# Patient Record
Sex: FEMALE | Race: BLACK OR AFRICAN AMERICAN | NOT HISPANIC OR LATINO | URBAN - METROPOLITAN AREA
[De-identification: names, ages, dates, MRNs, and addresses within clinical notes are randomized per-mention and may not be internally consistent; named-entity substitution may affect disease eponyms.]

---

## 2016-10-14 RX ORDER — METOPROLOL TARTRATE 50 MG
1 TABLET ORAL
Qty: 0 | Refills: 0 | COMMUNITY
Start: 2016-10-14

## 2016-10-14 RX ORDER — AMLODIPINE BESYLATE 2.5 MG/1
1 TABLET ORAL
Qty: 0 | Refills: 0 | COMMUNITY
Start: 2016-10-14

## 2017-03-22 ENCOUNTER — INPATIENT (INPATIENT)
Facility: HOSPITAL | Age: 82
LOS: 3 days | Discharge: HOME HEALTH | End: 2017-03-26
Attending: GENERAL PRACTICE | Admitting: GENERAL PRACTICE
Payer: MEDICARE

## 2017-03-22 VITALS
OXYGEN SATURATION: 98 % | SYSTOLIC BLOOD PRESSURE: 155 MMHG | TEMPERATURE: 98 F | DIASTOLIC BLOOD PRESSURE: 77 MMHG | RESPIRATION RATE: 24 BRPM | HEART RATE: 84 BPM

## 2017-03-22 LAB
ALBUMIN SERPL ELPH-MCNC: 3 G/DL — LOW (ref 3.3–5)
ALP SERPL-CCNC: 101 U/L — SIGNIFICANT CHANGE UP (ref 40–120)
ALT FLD-CCNC: 12 U/L — SIGNIFICANT CHANGE UP (ref 4–33)
APPEARANCE UR: SIGNIFICANT CHANGE UP
AST SERPL-CCNC: 18 U/L — SIGNIFICANT CHANGE UP (ref 4–32)
B PERT DNA SPEC QL NAA+PROBE: SIGNIFICANT CHANGE UP
BASE EXCESS BLDV CALC-SCNC: 0.5 MMOL/L — SIGNIFICANT CHANGE UP
BASOPHILS # BLD AUTO: 0.03 K/UL — SIGNIFICANT CHANGE UP (ref 0–0.2)
BASOPHILS NFR BLD AUTO: 0.4 % — SIGNIFICANT CHANGE UP (ref 0–2)
BILIRUB SERPL-MCNC: 0.3 MG/DL — SIGNIFICANT CHANGE UP (ref 0.2–1.2)
BILIRUB UR-MCNC: NEGATIVE — SIGNIFICANT CHANGE UP
BLOOD GAS VENOUS - CREATININE: 2.59 MG/DL — HIGH (ref 0.5–1.3)
BLOOD UR QL VISUAL: HIGH
BUN SERPL-MCNC: 41 MG/DL — HIGH (ref 7–23)
C PNEUM DNA SPEC QL NAA+PROBE: NOT DETECTED — SIGNIFICANT CHANGE UP
CALCIUM SERPL-MCNC: 8.7 MG/DL — SIGNIFICANT CHANGE UP (ref 8.4–10.5)
CHLORIDE BLDV-SCNC: 110 MMOL/L — HIGH (ref 96–108)
CHLORIDE SERPL-SCNC: 104 MMOL/L — SIGNIFICANT CHANGE UP (ref 98–107)
CK MB BLD-MCNC: 1.44 NG/ML — SIGNIFICANT CHANGE UP (ref 1–4.7)
CK SERPL-CCNC: 31 U/L — SIGNIFICANT CHANGE UP (ref 25–170)
CO2 SERPL-SCNC: 22 MMOL/L — SIGNIFICANT CHANGE UP (ref 22–31)
COLOR SPEC: YELLOW — SIGNIFICANT CHANGE UP
CREAT SERPL-MCNC: 2.45 MG/DL — HIGH (ref 0.5–1.3)
EOSINOPHIL # BLD AUTO: 0.12 K/UL — SIGNIFICANT CHANGE UP (ref 0–0.5)
EOSINOPHIL NFR BLD AUTO: 1.5 % — SIGNIFICANT CHANGE UP (ref 0–6)
FLUAV H1 2009 PAND RNA SPEC QL NAA+PROBE: NOT DETECTED — SIGNIFICANT CHANGE UP
FLUAV H1 RNA SPEC QL NAA+PROBE: NOT DETECTED — SIGNIFICANT CHANGE UP
FLUAV H3 RNA SPEC QL NAA+PROBE: NOT DETECTED — SIGNIFICANT CHANGE UP
FLUAV SUBTYP SPEC NAA+PROBE: SIGNIFICANT CHANGE UP
FLUBV RNA SPEC QL NAA+PROBE: NOT DETECTED — SIGNIFICANT CHANGE UP
GAS PNL BLDV: 142 MMOL/L — SIGNIFICANT CHANGE UP (ref 136–146)
GLUCOSE BLDV-MCNC: 83 — SIGNIFICANT CHANGE UP (ref 70–99)
GLUCOSE SERPL-MCNC: 100 MG/DL — HIGH (ref 70–99)
GLUCOSE UR-MCNC: NEGATIVE — SIGNIFICANT CHANGE UP
HADV DNA SPEC QL NAA+PROBE: NOT DETECTED — SIGNIFICANT CHANGE UP
HCO3 BLDV-SCNC: 24 MMOL/L — SIGNIFICANT CHANGE UP (ref 20–27)
HCOV 229E RNA SPEC QL NAA+PROBE: NOT DETECTED — SIGNIFICANT CHANGE UP
HCOV HKU1 RNA SPEC QL NAA+PROBE: NOT DETECTED — SIGNIFICANT CHANGE UP
HCOV NL63 RNA SPEC QL NAA+PROBE: NOT DETECTED — SIGNIFICANT CHANGE UP
HCOV OC43 RNA SPEC QL NAA+PROBE: NOT DETECTED — SIGNIFICANT CHANGE UP
HCT VFR BLD CALC: 30.7 % — LOW (ref 34.5–45)
HCT VFR BLDV CALC: SIGNIFICANT CHANGE UP % (ref 34.5–45)
HGB BLD-MCNC: 9.3 G/DL — LOW (ref 11.5–15.5)
HGB BLDV-MCNC: SIGNIFICANT CHANGE UP G/DL (ref 11.5–15.5)
HMPV RNA SPEC QL NAA+PROBE: NOT DETECTED — SIGNIFICANT CHANGE UP
HPIV1 RNA SPEC QL NAA+PROBE: NOT DETECTED — SIGNIFICANT CHANGE UP
HPIV2 RNA SPEC QL NAA+PROBE: NOT DETECTED — SIGNIFICANT CHANGE UP
HPIV3 RNA SPEC QL NAA+PROBE: NOT DETECTED — SIGNIFICANT CHANGE UP
HPIV4 RNA SPEC QL NAA+PROBE: NOT DETECTED — SIGNIFICANT CHANGE UP
HYALINE CASTS # UR AUTO: SIGNIFICANT CHANGE UP (ref 0–?)
IMM GRANULOCYTES NFR BLD AUTO: 0.3 % — SIGNIFICANT CHANGE UP (ref 0–1.5)
KETONES UR-MCNC: NEGATIVE — SIGNIFICANT CHANGE UP
LACTATE BLDV-MCNC: 1.3 MMOL/L — SIGNIFICANT CHANGE UP (ref 0.5–2)
LEUKOCYTE ESTERASE UR-ACNC: HIGH
LYMPHOCYTES # BLD AUTO: 1.62 K/UL — SIGNIFICANT CHANGE UP (ref 1–3.3)
LYMPHOCYTES # BLD AUTO: 20.5 % — SIGNIFICANT CHANGE UP (ref 13–44)
M PNEUMO DNA SPEC QL NAA+PROBE: NOT DETECTED — SIGNIFICANT CHANGE UP
MCHC RBC-ENTMCNC: 30.3 % — LOW (ref 32–36)
MCHC RBC-ENTMCNC: 31.2 PG — SIGNIFICANT CHANGE UP (ref 27–34)
MCV RBC AUTO: 103 FL — HIGH (ref 80–100)
MONOCYTES # BLD AUTO: 0.74 K/UL — SIGNIFICANT CHANGE UP (ref 0–0.9)
MONOCYTES NFR BLD AUTO: 9.4 % — SIGNIFICANT CHANGE UP (ref 2–14)
NEUTROPHILS # BLD AUTO: 5.37 K/UL — SIGNIFICANT CHANGE UP (ref 1.8–7.4)
NEUTROPHILS NFR BLD AUTO: 67.9 % — SIGNIFICANT CHANGE UP (ref 43–77)
NITRITE UR-MCNC: NEGATIVE — SIGNIFICANT CHANGE UP
NON-SQ EPI CELLS # UR AUTO: 3 — SIGNIFICANT CHANGE UP
PCO2 BLDV: 52 MMHG — HIGH (ref 41–51)
PH BLDV: 7.32 PH — SIGNIFICANT CHANGE UP (ref 7.32–7.43)
PH UR: 6.5 — SIGNIFICANT CHANGE UP (ref 4.6–8)
PLATELET # BLD AUTO: 406 K/UL — HIGH (ref 150–400)
PMV BLD: 9.8 FL — SIGNIFICANT CHANGE UP (ref 7–13)
PO2 BLDV: 34 MMHG — LOW (ref 35–40)
POTASSIUM BLDV-SCNC: 5.8 MMOL/L — HIGH (ref 3.4–4.5)
POTASSIUM SERPL-MCNC: 5.5 MMOL/L — HIGH (ref 3.5–5.3)
POTASSIUM SERPL-SCNC: 5.5 MMOL/L — HIGH (ref 3.5–5.3)
PROT SERPL-MCNC: 8.3 G/DL — SIGNIFICANT CHANGE UP (ref 6–8.3)
PROT UR-MCNC: 500 — HIGH
RBC # BLD: 2.98 M/UL — LOW (ref 3.8–5.2)
RBC # FLD: 16.9 % — HIGH (ref 10.3–14.5)
RBC CASTS # UR COMP ASSIST: SIGNIFICANT CHANGE UP (ref 0–?)
RSV RNA SPEC QL NAA+PROBE: NOT DETECTED — SIGNIFICANT CHANGE UP
RV+EV RNA SPEC QL NAA+PROBE: NOT DETECTED — SIGNIFICANT CHANGE UP
SAO2 % BLDV: 58 % — LOW (ref 60–85)
SODIUM SERPL-SCNC: 142 MMOL/L — SIGNIFICANT CHANGE UP (ref 135–145)
SP GR SPEC: 1.02 — SIGNIFICANT CHANGE UP (ref 1–1.03)
SQUAMOUS # UR AUTO: SIGNIFICANT CHANGE UP
TROPONIN T SERPL-MCNC: < 0.06 NG/ML — SIGNIFICANT CHANGE UP (ref 0–0.06)
UROBILINOGEN FLD QL: NORMAL E.U. — SIGNIFICANT CHANGE UP (ref 0.1–0.2)
WBC # BLD: 7.9 K/UL — SIGNIFICANT CHANGE UP (ref 3.8–10.5)
WBC # FLD AUTO: 7.9 K/UL — SIGNIFICANT CHANGE UP (ref 3.8–10.5)
WBC UR QL: >50 — HIGH (ref 0–?)

## 2017-03-22 RX ORDER — IPRATROPIUM/ALBUTEROL SULFATE 18-103MCG
3 AEROSOL WITH ADAPTER (GRAM) INHALATION
Qty: 0 | Refills: 0 | Status: COMPLETED | OUTPATIENT
Start: 2017-03-22 | End: 2017-03-23

## 2017-03-22 NOTE — ED PROVIDER NOTE - ATTENDING CONTRIBUTION TO CARE
Pt was seen and evaluated by me. Pt notes over the past 3 days to have increased weakness with SOB. Pt was seen by Pulmonologist and found to be sating in 80s. Pt denies any fever, cough, chest pain, or abd pain. Sating 98% on 2L NC. RRR. Coarse breath sounds b/l. Abd soft, non-tender.

## 2017-03-22 NOTE — ED ADULT NURSE NOTE - OBJECTIVE STATEMENT
Pt rec'd A&Ox3 from [facility] on nasal cannula, states " my doctor sent me from his office because my oxygen level went down to 81%." Pt appears slightly dyspneic on exertion, O2 saturation desats to 89% on room air while talking. Pt has hx of COPD denies home O2 use. Pt rec'd A&Ox3 from [facility] on nasal cannula, states " my doctor sent me from his office because my oxygen level went down to 81%." Pt appears slightly dyspneic on exertion, O2 saturation desats to 89% on room air while talking. Pt has hx of COPD denies home O2 use. Pt denies CP, n/v/d, c/o weakness and increased WALTERS x 2 days. Pt rpt hx of rt mastectomy, w/ arm precautions. Pt has skin graft on top of lt arm from hematoma from fall x 2mths ago. Pt has blanchable erythema between buttocks on exam. Placed on cardiac monitor report endorsed to primary RN.

## 2017-03-22 NOTE — ED ADULT TRIAGE NOTE - CHIEF COMPLAINT QUOTE
Arrives via ems from pmd's office after being found to be hypoxic. (po 85%ra). Pt  c/o generalized weakness and dyspnea on exertion x 2 days. Denies fever. denies cough.  Denies chest pain.   A+Ox3.  HOB up.  Pt in nad.  Pulse ox 98 % w O2 2Lnc at triage.

## 2017-03-22 NOTE — ED PROCEDURE NOTE - NS ED PROCEDURE ASSISTED BY
The procedure was performed independently The procedure was performed independently/Supervision was available

## 2017-03-22 NOTE — ED PROVIDER NOTE - PMH
Aortic valve disorder    Breast cancer  treated with radiation  Cataract  Bilateral eyes  COPD (chronic obstructive pulmonary disease)    Coronary artery disease    Hearing loss    High cholesterol    Hypertension    Shingles  2 years ago, right flank.  Pt. complains of residual "burning" nerve pain.

## 2017-03-22 NOTE — ED PROVIDER NOTE - PSH
H/O carotid endarterectomy  left  History of cholecystectomy    History of gastrectomy  1972  S/P cataract extraction  left  S/P coronary angiogram  November 2014- to assess aortic valve, no stents placed  S/P lumpectomy of breast  2010, right breast  S/P TAVR (transcatheter aortic valve replacement)  TRANSAORTIC APPROACH

## 2017-03-22 NOTE — ED PROVIDER NOTE - OBJECTIVE STATEMENT
89 y/o female with PMHx of COPD, CAD, HTN, and HLD presents to ED for increased weakness and SOB X 3 days. Pt states she was having increased weakness over the past 3 days with increased SOB and noted in Dr. Gonzalez's office to be sating in 80s. Pt denies any fever, cough, chest pain, or abd pain. Pt noted to be 89% on RA in triage and 98% on 2L NC.

## 2017-03-23 DIAGNOSIS — J44.1 CHRONIC OBSTRUCTIVE PULMONARY DISEASE WITH (ACUTE) EXACERBATION: ICD-10-CM

## 2017-03-23 DIAGNOSIS — N30.00 ACUTE CYSTITIS WITHOUT HEMATURIA: ICD-10-CM

## 2017-03-23 DIAGNOSIS — I10 ESSENTIAL (PRIMARY) HYPERTENSION: ICD-10-CM

## 2017-03-23 DIAGNOSIS — R54 AGE-RELATED PHYSICAL DEBILITY: ICD-10-CM

## 2017-03-23 DIAGNOSIS — Z41.8 ENCOUNTER FOR OTHER PROCEDURES FOR PURPOSES OTHER THAN REMEDYING HEALTH STATE: ICD-10-CM

## 2017-03-23 DIAGNOSIS — R09.02 HYPOXEMIA: ICD-10-CM

## 2017-03-23 DIAGNOSIS — D64.9 ANEMIA, UNSPECIFIED: ICD-10-CM

## 2017-03-23 DIAGNOSIS — N18.4 CHRONIC KIDNEY DISEASE, STAGE 4 (SEVERE): ICD-10-CM

## 2017-03-23 DIAGNOSIS — I25.10 ATHEROSCLEROTIC HEART DISEASE OF NATIVE CORONARY ARTERY WITHOUT ANGINA PECTORIS: ICD-10-CM

## 2017-03-23 LAB
APTT BLD: 31.6 SEC — SIGNIFICANT CHANGE UP (ref 27.5–37.4)
BASE EXCESS BLDV CALC-SCNC: -3.3 MMOL/L — SIGNIFICANT CHANGE UP
BASOPHILS # BLD AUTO: 0.02 K/UL — SIGNIFICANT CHANGE UP (ref 0–0.2)
BASOPHILS NFR BLD AUTO: 0.2 % — SIGNIFICANT CHANGE UP (ref 0–2)
BLOOD GAS VENOUS - CREATININE: 2.4 MG/DL — HIGH (ref 0.5–1.3)
BUN SERPL-MCNC: 40 MG/DL — HIGH (ref 7–23)
BUN SERPL-MCNC: 41 MG/DL — HIGH (ref 7–23)
CALCIUM SERPL-MCNC: 8.4 MG/DL — SIGNIFICANT CHANGE UP (ref 8.4–10.5)
CALCIUM SERPL-MCNC: 8.9 MG/DL — SIGNIFICANT CHANGE UP (ref 8.4–10.5)
CHLORIDE BLDV-SCNC: 113 MMOL/L — HIGH (ref 96–108)
CHLORIDE SERPL-SCNC: 102 MMOL/L — SIGNIFICANT CHANGE UP (ref 98–107)
CHLORIDE SERPL-SCNC: 106 MMOL/L — SIGNIFICANT CHANGE UP (ref 98–107)
CO2 SERPL-SCNC: 20 MMOL/L — LOW (ref 22–31)
CO2 SERPL-SCNC: 25 MMOL/L — SIGNIFICANT CHANGE UP (ref 22–31)
CREAT SERPL-MCNC: 2.44 MG/DL — HIGH (ref 0.5–1.3)
CREAT SERPL-MCNC: 2.47 MG/DL — HIGH (ref 0.5–1.3)
EOSINOPHIL # BLD AUTO: 0.01 K/UL — SIGNIFICANT CHANGE UP (ref 0–0.5)
EOSINOPHIL NFR BLD AUTO: 0.1 % — SIGNIFICANT CHANGE UP (ref 0–6)
GAS PNL BLDV: 142 MMOL/L — SIGNIFICANT CHANGE UP (ref 136–146)
GLUCOSE BLDV-MCNC: 122 — HIGH (ref 70–99)
GLUCOSE SERPL-MCNC: 123 MG/DL — HIGH (ref 70–99)
GLUCOSE SERPL-MCNC: 83 MG/DL — SIGNIFICANT CHANGE UP (ref 70–99)
HCO3 BLDV-SCNC: 21 MMOL/L — SIGNIFICANT CHANGE UP (ref 20–27)
HCT VFR BLD CALC: 30.1 % — LOW (ref 34.5–45)
HCT VFR BLDV CALC: 28.8 % — LOW (ref 34.5–45)
HGB BLD-MCNC: 9.1 G/DL — LOW (ref 11.5–15.5)
HGB BLDV-MCNC: 9.3 G/DL — LOW (ref 11.5–15.5)
IMM GRANULOCYTES NFR BLD AUTO: 0.2 % — SIGNIFICANT CHANGE UP (ref 0–1.5)
INR BLD: 0.96 — SIGNIFICANT CHANGE UP (ref 0.88–1.17)
LACTATE BLDV-MCNC: 1.7 MMOL/L — SIGNIFICANT CHANGE UP (ref 0.5–2)
LYMPHOCYTES # BLD AUTO: 0.42 K/UL — LOW (ref 1–3.3)
LYMPHOCYTES # BLD AUTO: 5.2 % — LOW (ref 13–44)
MAGNESIUM SERPL-MCNC: 2 MG/DL — SIGNIFICANT CHANGE UP (ref 1.6–2.6)
MCHC RBC-ENTMCNC: 30.2 % — LOW (ref 32–36)
MCHC RBC-ENTMCNC: 31.4 PG — SIGNIFICANT CHANGE UP (ref 27–34)
MCV RBC AUTO: 103.8 FL — HIGH (ref 80–100)
MONOCYTES # BLD AUTO: 0.12 K/UL — SIGNIFICANT CHANGE UP (ref 0–0.9)
MONOCYTES NFR BLD AUTO: 1.5 % — LOW (ref 2–14)
NEUTROPHILS # BLD AUTO: 7.45 K/UL — HIGH (ref 1.8–7.4)
NEUTROPHILS NFR BLD AUTO: 92.8 % — HIGH (ref 43–77)
PCO2 BLDV: 47 MMHG — SIGNIFICANT CHANGE UP (ref 41–51)
PH BLDV: 7.3 PH — LOW (ref 7.32–7.43)
PHOSPHATE SERPL-MCNC: 3.7 MG/DL — SIGNIFICANT CHANGE UP (ref 2.5–4.5)
PLATELET # BLD AUTO: 390 K/UL — SIGNIFICANT CHANGE UP (ref 150–400)
PMV BLD: 10.2 FL — SIGNIFICANT CHANGE UP (ref 7–13)
PO2 BLDV: 45 MMHG — HIGH (ref 35–40)
POTASSIUM BLDV-SCNC: 4.5 MMOL/L — SIGNIFICANT CHANGE UP (ref 3.4–4.5)
POTASSIUM SERPL-MCNC: 4.6 MMOL/L — SIGNIFICANT CHANGE UP (ref 3.5–5.3)
POTASSIUM SERPL-MCNC: 5.4 MMOL/L — HIGH (ref 3.5–5.3)
POTASSIUM SERPL-SCNC: 4.6 MMOL/L — SIGNIFICANT CHANGE UP (ref 3.5–5.3)
POTASSIUM SERPL-SCNC: 5.4 MMOL/L — HIGH (ref 3.5–5.3)
PROTHROM AB SERPL-ACNC: 10.8 SEC — SIGNIFICANT CHANGE UP (ref 9.8–13.1)
RBC # BLD: 2.9 M/UL — LOW (ref 3.8–5.2)
RBC # FLD: 17 % — HIGH (ref 10.3–14.5)
SAO2 % BLDV: 73.9 % — SIGNIFICANT CHANGE UP (ref 60–85)
SODIUM SERPL-SCNC: 142 MMOL/L — SIGNIFICANT CHANGE UP (ref 135–145)
SODIUM SERPL-SCNC: 142 MMOL/L — SIGNIFICANT CHANGE UP (ref 135–145)
WBC # BLD: 8.04 K/UL — SIGNIFICANT CHANGE UP (ref 3.8–10.5)
WBC # FLD AUTO: 8.04 K/UL — SIGNIFICANT CHANGE UP (ref 3.8–10.5)

## 2017-03-23 PROCEDURE — 99223 1ST HOSP IP/OBS HIGH 75: CPT

## 2017-03-23 PROCEDURE — 71020: CPT | Mod: 26

## 2017-03-23 RX ORDER — FUROSEMIDE 40 MG
1 TABLET ORAL
Qty: 0 | Refills: 0 | COMMUNITY

## 2017-03-23 RX ORDER — HEPARIN SODIUM 5000 [USP'U]/ML
5000 INJECTION INTRAVENOUS; SUBCUTANEOUS EVERY 12 HOURS
Qty: 0 | Refills: 0 | Status: DISCONTINUED | OUTPATIENT
Start: 2017-03-23 | End: 2017-03-26

## 2017-03-23 RX ORDER — AMLODIPINE BESYLATE 2.5 MG/1
10 TABLET ORAL DAILY
Qty: 0 | Refills: 0 | Status: DISCONTINUED | OUTPATIENT
Start: 2017-03-23 | End: 2017-03-26

## 2017-03-23 RX ORDER — GABAPENTIN 400 MG/1
100 CAPSULE ORAL AT BEDTIME
Qty: 0 | Refills: 0 | Status: DISCONTINUED | OUTPATIENT
Start: 2017-03-23 | End: 2017-03-26

## 2017-03-23 RX ORDER — CEFTRIAXONE 500 MG/1
INJECTION, POWDER, FOR SOLUTION INTRAMUSCULAR; INTRAVENOUS
Qty: 0 | Refills: 0 | Status: DISCONTINUED | OUTPATIENT
Start: 2017-03-23 | End: 2017-03-25

## 2017-03-23 RX ORDER — CEFTRIAXONE 500 MG/1
1 INJECTION, POWDER, FOR SOLUTION INTRAMUSCULAR; INTRAVENOUS EVERY 24 HOURS
Qty: 0 | Refills: 0 | Status: DISCONTINUED | OUTPATIENT
Start: 2017-03-24 | End: 2017-03-25

## 2017-03-23 RX ORDER — CEFTRIAXONE 500 MG/1
1 INJECTION, POWDER, FOR SOLUTION INTRAMUSCULAR; INTRAVENOUS ONCE
Qty: 0 | Refills: 0 | Status: COMPLETED | OUTPATIENT
Start: 2017-03-23 | End: 2017-03-23

## 2017-03-23 RX ORDER — LANOLIN ALCOHOL/MO/W.PET/CERES
3 CREAM (GRAM) TOPICAL AT BEDTIME
Qty: 0 | Refills: 0 | Status: COMPLETED | OUTPATIENT
Start: 2017-03-23 | End: 2017-03-23

## 2017-03-23 RX ORDER — BUDESONIDE AND FORMOTEROL FUMARATE DIHYDRATE 160; 4.5 UG/1; UG/1
1 AEROSOL RESPIRATORY (INHALATION)
Qty: 0 | Refills: 0 | Status: DISCONTINUED | OUTPATIENT
Start: 2017-03-23 | End: 2017-03-26

## 2017-03-23 RX ORDER — FLUTICASONE FUROATE AND VILANTEROL TRIFENATATE 100; 25 UG/1; UG/1
1 POWDER RESPIRATORY (INHALATION)
Qty: 0 | Refills: 0 | COMMUNITY

## 2017-03-23 RX ORDER — METOPROLOL TARTRATE 50 MG
25 TABLET ORAL EVERY 12 HOURS
Qty: 0 | Refills: 0 | Status: DISCONTINUED | OUTPATIENT
Start: 2017-03-23 | End: 2017-03-26

## 2017-03-23 RX ORDER — ASPIRIN/CALCIUM CARB/MAGNESIUM 324 MG
81 TABLET ORAL DAILY
Qty: 0 | Refills: 0 | Status: DISCONTINUED | OUTPATIENT
Start: 2017-03-23 | End: 2017-03-26

## 2017-03-23 RX ORDER — IPRATROPIUM/ALBUTEROL SULFATE 18-103MCG
3 AEROSOL WITH ADAPTER (GRAM) INHALATION EVERY 6 HOURS
Qty: 0 | Refills: 0 | Status: DISCONTINUED | OUTPATIENT
Start: 2017-03-23 | End: 2017-03-26

## 2017-03-23 RX ORDER — ATORVASTATIN CALCIUM 80 MG/1
10 TABLET, FILM COATED ORAL AT BEDTIME
Qty: 0 | Refills: 0 | Status: DISCONTINUED | OUTPATIENT
Start: 2017-03-23 | End: 2017-03-26

## 2017-03-23 RX ADMIN — BUDESONIDE AND FORMOTEROL FUMARATE DIHYDRATE 1 PUFF(S): 160; 4.5 AEROSOL RESPIRATORY (INHALATION) at 21:19

## 2017-03-23 RX ADMIN — Medication 25 MILLIGRAM(S): at 18:10

## 2017-03-23 RX ADMIN — Medication 3 MILLIGRAM(S): at 23:53

## 2017-03-23 RX ADMIN — CEFTRIAXONE 100 GRAM(S): 500 INJECTION, POWDER, FOR SOLUTION INTRAMUSCULAR; INTRAVENOUS at 06:15

## 2017-03-23 RX ADMIN — Medication 3 MILLILITER(S): at 02:34

## 2017-03-23 RX ADMIN — Medication 60 MILLIGRAM(S): at 01:36

## 2017-03-23 RX ADMIN — Medication 40 MILLIGRAM(S): at 21:19

## 2017-03-23 RX ADMIN — Medication 3 MILLILITER(S): at 15:33

## 2017-03-23 RX ADMIN — Medication 3 MILLILITER(S): at 01:23

## 2017-03-23 RX ADMIN — Medication 3 MILLILITER(S): at 21:34

## 2017-03-23 RX ADMIN — GABAPENTIN 100 MILLIGRAM(S): 400 CAPSULE ORAL at 21:19

## 2017-03-23 RX ADMIN — Medication 3 MILLILITER(S): at 01:45

## 2017-03-23 RX ADMIN — AMLODIPINE BESYLATE 10 MILLIGRAM(S): 2.5 TABLET ORAL at 06:15

## 2017-03-23 RX ADMIN — Medication 25 MILLIGRAM(S): at 06:15

## 2017-03-23 RX ADMIN — Medication 81 MILLIGRAM(S): at 13:42

## 2017-03-23 RX ADMIN — HEPARIN SODIUM 5000 UNIT(S): 5000 INJECTION INTRAVENOUS; SUBCUTANEOUS at 18:12

## 2017-03-23 RX ADMIN — ATORVASTATIN CALCIUM 10 MILLIGRAM(S): 80 TABLET, FILM COATED ORAL at 21:19

## 2017-03-23 RX ADMIN — HEPARIN SODIUM 5000 UNIT(S): 5000 INJECTION INTRAVENOUS; SUBCUTANEOUS at 06:15

## 2017-03-23 NOTE — H&P ADULT. - PROBLEM SELECTOR PLAN 7
Mild dehydration on CKD stage IV  Given piror Lasix regimen and (resolved) LE edema will Hold of on IV fluids  Oral rehydration

## 2017-03-23 NOTE — H&P ADULT. - FAMILY HISTORY
Father  Still living? Unknown  Family history of stroke, Age at diagnosis: Age Unknown  Family history of hypertension, Age at diagnosis: Age Unknown  Family history of heart disease, Age at diagnosis: Age Unknown

## 2017-03-23 NOTE — H&P ADULT. - PROBLEM SELECTOR PLAN 6
Heparin sq Chronic; h/h at baseline; Prior occult blood test negative   -outpt f/u Chronic 2/2 CDK; h/h at baseline; Prior occult blood test negative   -outpt f/u

## 2017-03-23 NOTE — H&P ADULT. - EKG AND INTERPRETATION
NSR, 79bpm, zzi=261, RBBB, no acute Tw or ST changes, no changes compared to EKG from 10/13/2016 - my reading

## 2017-03-23 NOTE — H&P ADULT. - ASSESSMENT
89 y/o female, ambulatory with a cane or a walker (no recent h/o falls) with Hx of COPD, HTN, HLD, COPD, bioprosthetic aortic valve repair, HLD a/w UTI, generalized weakness and hypoxemia likely 2/2 COPD exacerbation; 89 y/o female, ambulatory with a cane or a walker (no recent h/o falls) with Hx of COPD, HTN, HLD, COPD, bioprosthetic aortic valve repair, HLD a/w UTI, generalized weakness and hypoxemia likely 2/2 mild COPD exacerbation; Frailty;

## 2017-03-23 NOTE — H&P ADULT. - PROBLEM SELECTOR PLAN 1
Due to mild COPD exacerbation;  -Pulmonology c/s (Dr.William Gonzalez) in AM  -Start Perdnisone taper  -Constant O2 sat monitoring (the pt desaturates off O2 to low 80s)  -Supp O2 NS 2-3?/min  -VS q4h  -Duoneb q6h  -Breo Ellipta --->Symbicort  -Will defer abx to Dr. Gonzalez   -Known chronic effusions, possible CT chest if not improving  -RVP negative Due to mild COPD exacerbation;  -Pulmonology c/s (Dr.William Gonzalez) in AM  -Start Prednisone taper  -Constant O2 sat monitoring (the pt desaturates off O2 to low 80s)  -Supp O2 NS 2-3/min  -VS q4h  -f/u official CXR report   -Duoneb q6h  -Breo Ellipta --->Symbicort  -Will defer abx to Dr. Gonzalez   -Known chronic pleural effusions (prior TTE and radiology), possible CT chest if not improving   -RVP negative

## 2017-03-23 NOTE — ED ADULT NURSE REASSESSMENT NOTE - NS ED NURSE REASSESS COMMENT FT1
Patient resting in bed and appears to be in no apparent distress.  Patient on continuous O2 monitoring, Sat @ 95 - 97 %.  Vitals taken and will continue to monitor patient.  Patient awaiting a bed and admitted to [facility].

## 2017-03-23 NOTE — ED ADULT NURSE REASSESSMENT NOTE - NS ED NURSE REASSESS COMMENT FT1
Patient sitting in bed and appears to be in no apparent distress.  Receiving Duo neb tx per MD orders and tolerating tx well.  Patient admitted to MED and awaiting transfer to the unit.  Will continue to monitor patient.

## 2017-03-23 NOTE — H&P ADULT. - HISTORY OF PRESENT ILLNESS
89 y/o female with Hx of COPD, HTN, HLD, COPD, bioprosthetic aortic valve repair, HLD presents to ED for increased weakness and SOB X 3 days. Pt states she was having increased weakness over the past 3 days with increased SOB and noted in Dr. Gonzalez's office to be sating in 80s. Pt reports no fever, cough, chest pain, or abd pain.    ED course: noted to be 89% on RA in triage and 98% on 2L NC. 89 y/o female, ambulatory with a cane or a walker (no recent h/o falls) with Hx of COPD, HTN, HLD, COPD, bioprosthetic aortic valve repair, HLD presents to ED for increased weakness and SOB. Pt states she was having increased weakness over the past 3 days with increased SOB with "occasional" cough with (sometimes) sputum. Noted in Dr. Gonzalez's office to be sating in 80s. Pt reports no CP, palpitations fever, cough, sore throat, abd pain or urinary symptoms.     ED course: noted to be 89% on RA in triage and 98% on 2L NC. 89 y/o female, ambulatory with a cane or a walker (no recent h/o falls) with Hx of COPD, HTN, HLD, COPD, bioprosthetic aortic valve repair, HLD presents to ED for increased weakness and SOB. Pt states she was having increased weakness over the past 3 days with increased SOB with "occasional" cough with (sometimes) sputum. Noted in Dr. Gonzalez's office to be sating in 80s. Pt reports no CP, palpitations fever, cough, sore throat, abd pain or urinary symptoms.     ED course: noted to be 89% on RA in triage and 98% on 2L NC  (Desated to 80% while off supp O2; )

## 2017-03-23 NOTE — H&P ADULT. - OTHER
TTE, 2014, 1. Mitral annular calcification and calcified mitral leaflets with normal diastolic opening. Mild mitral regurgitation. 2. Bioprosthetic aortic valve replacement. Patient is s/p TAVR.  Peak transaortic valve gradient equals 14 mm Hg, mean transaortic valve gradient equals 7 mm Hg, which is  probably normal in the presence of a prosthetic valve. Minimal paravalvular aortic regurgitation. 3. Hyperdynamic left ventricle. 4. Normal right ventricular size and function. 5. Small pericardial effusion. 6. Bilateral pleural effusions.

## 2017-03-23 NOTE — H&P ADULT. - LYMPHATIC
posterior cervical L/supraclavicular R/supraclavicular L/anterior cervical L/anterior cervical R/posterior cervical R

## 2017-03-23 NOTE — ED ADULT NURSE REASSESSMENT NOTE - NS ED NURSE REASSESS COMMENT FT1
Patient sleeping in bed and appears to be in no apparent distress.  Vitals taken and will continue to monitor patient.

## 2017-03-24 ENCOUNTER — TRANSCRIPTION ENCOUNTER (OUTPATIENT)
Age: 82
End: 2017-03-24

## 2017-03-24 LAB
BUN SERPL-MCNC: 51 MG/DL — HIGH (ref 7–23)
BUN SERPL-MCNC: 59 MG/DL — HIGH (ref 7–23)
CALCIUM SERPL-MCNC: 8.1 MG/DL — LOW (ref 8.4–10.5)
CALCIUM SERPL-MCNC: 8.7 MG/DL — SIGNIFICANT CHANGE UP (ref 8.4–10.5)
CHLORIDE SERPL-SCNC: 105 MMOL/L — SIGNIFICANT CHANGE UP (ref 98–107)
CHLORIDE SERPL-SCNC: 107 MMOL/L — SIGNIFICANT CHANGE UP (ref 98–107)
CO2 SERPL-SCNC: 21 MMOL/L — LOW (ref 22–31)
CO2 SERPL-SCNC: 21 MMOL/L — LOW (ref 22–31)
CREAT SERPL-MCNC: 2.52 MG/DL — HIGH (ref 0.5–1.3)
CREAT SERPL-MCNC: 2.68 MG/DL — HIGH (ref 0.5–1.3)
GLUCOSE SERPL-MCNC: 152 MG/DL — HIGH (ref 70–99)
GLUCOSE SERPL-MCNC: 205 MG/DL — HIGH (ref 70–99)
HCT VFR BLD CALC: 27.8 % — LOW (ref 34.5–45)
HGB BLD-MCNC: 8.5 G/DL — LOW (ref 11.5–15.5)
MCHC RBC-ENTMCNC: 30.6 % — LOW (ref 32–36)
MCHC RBC-ENTMCNC: 31.3 PG — SIGNIFICANT CHANGE UP (ref 27–34)
MCV RBC AUTO: 102.2 FL — HIGH (ref 80–100)
PLATELET # BLD AUTO: 407 K/UL — HIGH (ref 150–400)
PMV BLD: 10.1 FL — SIGNIFICANT CHANGE UP (ref 7–13)
POTASSIUM SERPL-MCNC: 5.2 MMOL/L — SIGNIFICANT CHANGE UP (ref 3.5–5.3)
POTASSIUM SERPL-MCNC: 5.9 MMOL/L — HIGH (ref 3.5–5.3)
POTASSIUM SERPL-SCNC: 5.2 MMOL/L — SIGNIFICANT CHANGE UP (ref 3.5–5.3)
POTASSIUM SERPL-SCNC: 5.9 MMOL/L — HIGH (ref 3.5–5.3)
RBC # BLD: 2.72 M/UL — LOW (ref 3.8–5.2)
RBC # FLD: 17.2 % — HIGH (ref 10.3–14.5)
SODIUM SERPL-SCNC: 142 MMOL/L — SIGNIFICANT CHANGE UP (ref 135–145)
SODIUM SERPL-SCNC: 144 MMOL/L — SIGNIFICANT CHANGE UP (ref 135–145)
SPECIMEN SOURCE: SIGNIFICANT CHANGE UP
WBC # BLD: 8.91 K/UL — SIGNIFICANT CHANGE UP (ref 3.8–10.5)
WBC # FLD AUTO: 8.91 K/UL — SIGNIFICANT CHANGE UP (ref 3.8–10.5)

## 2017-03-24 RX ORDER — CEFTRIAXONE 500 MG/1
1000 INJECTION, POWDER, FOR SOLUTION INTRAMUSCULAR; INTRAVENOUS ONCE
Qty: 0 | Refills: 0 | Status: COMPLETED | OUTPATIENT
Start: 2017-03-24 | End: 2017-03-24

## 2017-03-24 RX ORDER — SODIUM POLYSTYRENE SULFONATE 4.1 MEQ/G
15 POWDER, FOR SUSPENSION ORAL ONCE
Qty: 0 | Refills: 0 | Status: COMPLETED | OUTPATIENT
Start: 2017-03-24 | End: 2017-03-24

## 2017-03-24 RX ORDER — ERYTHROMYCIN BASE 5 MG/GRAM
1 OINTMENT (GRAM) OPHTHALMIC (EYE)
Qty: 0 | Refills: 0 | Status: DISCONTINUED | OUTPATIENT
Start: 2017-03-24 | End: 2017-03-26

## 2017-03-24 RX ORDER — ERYTHROMYCIN BASE 5 MG/GRAM
1 OINTMENT (GRAM) OPHTHALMIC (EYE) ONCE
Qty: 0 | Refills: 0 | Status: COMPLETED | OUTPATIENT
Start: 2017-03-24 | End: 2017-03-24

## 2017-03-24 RX ADMIN — CEFTRIAXONE 1000 MILLIGRAM(S): 500 INJECTION, POWDER, FOR SOLUTION INTRAMUSCULAR; INTRAVENOUS at 10:49

## 2017-03-24 RX ADMIN — Medication 3 MILLILITER(S): at 10:10

## 2017-03-24 RX ADMIN — Medication 81 MILLIGRAM(S): at 11:02

## 2017-03-24 RX ADMIN — ATORVASTATIN CALCIUM 10 MILLIGRAM(S): 80 TABLET, FILM COATED ORAL at 21:52

## 2017-03-24 RX ADMIN — SODIUM POLYSTYRENE SULFONATE 15 GRAM(S): 4.1 POWDER, FOR SUSPENSION ORAL at 10:52

## 2017-03-24 RX ADMIN — Medication 3 MILLILITER(S): at 15:41

## 2017-03-24 RX ADMIN — BUDESONIDE AND FORMOTEROL FUMARATE DIHYDRATE 1 PUFF(S): 160; 4.5 AEROSOL RESPIRATORY (INHALATION) at 10:49

## 2017-03-24 RX ADMIN — BUDESONIDE AND FORMOTEROL FUMARATE DIHYDRATE 1 PUFF(S): 160; 4.5 AEROSOL RESPIRATORY (INHALATION) at 21:52

## 2017-03-24 RX ADMIN — GABAPENTIN 100 MILLIGRAM(S): 400 CAPSULE ORAL at 21:52

## 2017-03-24 RX ADMIN — Medication 40 MILLIGRAM(S): at 06:44

## 2017-03-24 RX ADMIN — AMLODIPINE BESYLATE 10 MILLIGRAM(S): 2.5 TABLET ORAL at 06:43

## 2017-03-24 RX ADMIN — Medication 25 MILLIGRAM(S): at 18:41

## 2017-03-24 RX ADMIN — Medication 25 MILLIGRAM(S): at 06:43

## 2017-03-24 RX ADMIN — HEPARIN SODIUM 5000 UNIT(S): 5000 INJECTION INTRAVENOUS; SUBCUTANEOUS at 18:42

## 2017-03-24 RX ADMIN — Medication 1 APPLICATION(S): at 18:41

## 2017-03-24 RX ADMIN — HEPARIN SODIUM 5000 UNIT(S): 5000 INJECTION INTRAVENOUS; SUBCUTANEOUS at 06:43

## 2017-03-24 RX ADMIN — Medication 1 APPLICATION(S): at 10:48

## 2017-03-24 NOTE — DISCHARGE NOTE ADULT - PATIENT PORTAL LINK FT
“You can access the [employer] Patient Portal, offered by [facility], by registering with the following website: [email or web address]”

## 2017-03-24 NOTE — PROVIDER CONTACT NOTE (OTHER) - ASSESSMENT
Vitals stable.
Pt agitated and uncooperative. Pt brought closer to nurses station. Right eye was difficult for pt to open even with warm wash cloth, crusty with bilateral redness.

## 2017-03-24 NOTE — PROVIDER CONTACT NOTE (OTHER) - SITUATION
Pt confused and agitated. Pulled out IV. Pt refusing new IV placement. Discussed with pt need for IV for antibiotics. Pt states "no I don't want to hear it, you're not gonna find anything."
Pt confused. Getting up OOB, denies need for bathroom but has had BM on room floor and unit floor. Pt shouting at staff.

## 2017-03-24 NOTE — DISCHARGE NOTE ADULT - PLAN OF CARE
symptoms control resolved infection BP control fall precautions Continue ASA, statin.   Echo; Normal LV, EF 65%. Moderate mitral regurgitation. mild pulmonary hypertension. Bilateral pleural effusions.***Unable to exclude small valvular vegetations  as all valves were not well visualized***** daily Metoprolol treated with IV Rocephin, Urine culture E-coli Please make a follow up appt with PCP in 1 week Home Physical therapy arranged by the  treated with 3 days of Rocephin, Urine culture E-coli Please make a follow up appt with your pulmonologist in 1 week. Complete Prednisone taper as ordered: as of 3/27 tape Prednisone 20 mg x 3 days then 10 mg x 3 days and stop

## 2017-03-24 NOTE — DISCHARGE NOTE ADULT - MEDICATION SUMMARY - MEDICATIONS TO TAKE
I will START or STAY ON the medications listed below when I get home from the hospital:    predniSONE 10 mg oral tablet  -- 1 tab(s) by mouth once a day  -- It is very important that you take or use this exactly as directed.  Do not skip doses or discontinue unless directed by your doctor.  Obtain medical advice before taking any non-prescription drugs as some may affect the action of this medication.  Take with food or milk.    -- Indication: For Chronic obstructive pulmonary disease with acute exacerbation    aspirin 81 mg oral tablet, chewable  -- 1 tab(s) by mouth once a day  -- Indication: For Coronary artery disease    acetaminophen 325 mg oral tablet  -- 2 tab(s) by mouth every 6 hours, As needed, mild and moderate pain  -- Indication: For pain management     gabapentin 100 mg oral capsule  -- 1 cap(s) by mouth once a day (at bedtime)  -- Indication: For Neuropathic pain     atorvastatin 10 mg oral tablet  -- 1 tab(s) by mouth once a day (at bedtime)  -- Indication: For Coronary artery disease    Metoprolol Succinate ER 25 mg oral tablet, extended release  -- 1 tab(s) by mouth once a day  -- Indication: For Coronary artery disease    Breo Ellipta 100 mcg-25 mcg/inh inhalation powder  -- 1 puff(s) inhaled once a day  -- Indication: For Chronic obstructive pulmonary disease with acute exacerbation    ProAir HFA 90 mcg/inh inhalation aerosol  -- 1 puff(s) inhaled 4 times a day as needed for wheezing or SOB  -- For inhalation only.  It is very important that you take or use this exactly as directed.  Do not skip doses or discontinue unless directed by your doctor.  Obtain medical advice before taking any non-prescription drugs as some may affect the action of this medication.  Shake well before use.    -- Indication: For Chronic obstructive pulmonary disease with acute exacerbation    amLODIPine 10 mg oral tablet  -- 1 tab(s) by mouth once a day  -- Indication: For Essential hypertension    erythromycin 0.5% ophthalmic ointment  -- 1 application to each affected eye 2 times a day  -- Indication: For Eye infection     pantoprazole 40 mg oral delayed release tablet  -- 1 tab(s) by mouth once a day (before a meal)  -- Indication: For prophylaxis I will START or STAY ON the medications listed below when I get home from the hospital:    predniSONE 10 mg oral tablet  -- take 2 tabs x 3 days starting 3/27, then 1 tab x 3 days then stop   -- It is very important that you take or use this exactly as directed.  Do not skip doses or discontinue unless directed by your doctor.  Obtain medical advice before taking any non-prescription drugs as some may affect the action of this medication.  Take with food or milk.    -- Indication: For Chronic obstructive pulmonary disease with acute exacerbation    aspirin 81 mg oral tablet, chewable  -- 1 tab(s) by mouth once a day  -- Indication: For Coronary artery disease    acetaminophen 325 mg oral tablet  -- 2 tab(s) by mouth every 6 hours, As needed, mild and moderate pain  -- Indication: For pain management     gabapentin 100 mg oral capsule  -- 1 cap(s) by mouth once a day (at bedtime)  -- Indication: For Neuropathic pain     atorvastatin 10 mg oral tablet  -- 1 tab(s) by mouth once a day (at bedtime)  -- Indication: For Coronary artery disease    Metoprolol Succinate ER 25 mg oral tablet, extended release  -- 1 tab(s) by mouth once a day  -- Indication: For Coronary artery disease    ProAir HFA 90 mcg/inh inhalation aerosol  -- 1 puff(s) inhaled 4 times a day as needed for wheezing or SOB  -- For inhalation only.  It is very important that you take or use this exactly as directed.  Do not skip doses or discontinue unless directed by your doctor.  Obtain medical advice before taking any non-prescription drugs as some may affect the action of this medication.  Shake well before use.    -- Indication: For Chronic obstructive pulmonary disease with acute exacerbation    Breo Ellipta 100 mcg-25 mcg/inh inhalation powder  -- 1 puff(s) inhaled once a day  -- Indication: For Chronic obstructive pulmonary disease with acute exacerbation    amLODIPine 10 mg oral tablet  -- 1 tab(s) by mouth once a day  -- Indication: For Essential hypertension    erythromycin 0.5% ophthalmic ointment  -- 1 application to each affected eye 2 times a day  -- Indication: For Eye infection     pantoprazole 40 mg oral delayed release tablet  -- 1 tab(s) by mouth once a day (before a meal)  -- Indication: For Prophylaxis

## 2017-03-24 NOTE — DISCHARGE NOTE ADULT - CARE PLAN
Principal Discharge DX:	Hypoxemia  Goal:	symptoms control  Instructions for follow-up, activity and diet:	Please make a follow up appt with PCP in 1 week  Secondary Diagnosis:	Acute cystitis without hematuria  Goal:	resolved infection  Instructions for follow-up, activity and diet:	treated with IV Rocephin, Urine culture E-coli  Secondary Diagnosis:	Coronary artery disease  Goal:	symptoms control  Instructions for follow-up, activity and diet:	Continue ASA, statin.   Echo; Normal LV, EF 65%. Moderate mitral regurgitation. mild pulmonary hypertension. Bilateral pleural effusions.***Unable to exclude small valvular vegetations  as all valves were not well visualized*****  Secondary Diagnosis:	Essential hypertension  Goal:	BP control  Instructions for follow-up, activity and diet:	daily Metoprolol  Secondary Diagnosis:	Frailty  Goal:	fall precautions Principal Discharge DX:	Hypoxemia  Goal:	symptoms control  Instructions for follow-up, activity and diet:	Please make a follow up appt with your pulmonologist in 1 week. Complete Prednisone taper as ordered: as of 3/27 tape Prednisone 20 mg x 3 days then 10 mg x 3 days and stop  Secondary Diagnosis:	Acute cystitis without hematuria  Goal:	resolved infection  Instructions for follow-up, activity and diet:	treated with 3 days of Rocephin, Urine culture E-coli  Secondary Diagnosis:	Coronary artery disease  Goal:	symptoms control  Instructions for follow-up, activity and diet:	Continue ASA, statin.   Echo; Normal LV, EF 65%. Moderate mitral regurgitation. mild pulmonary hypertension. Bilateral pleural effusions.***Unable to exclude small valvular vegetations  as all valves were not well visualized*****  Secondary Diagnosis:	Essential hypertension  Goal:	BP control  Instructions for follow-up, activity and diet:	daily Metoprolol  Secondary Diagnosis:	Frailty  Goal:	fall precautions  Instructions for follow-up, activity and diet:	Home Physical therapy arranged by the

## 2017-03-24 NOTE — PROVIDER CONTACT NOTE (OTHER) - ACTION/TREATMENT ORDERED:
PA made aware. AM labs sent. Will continue to monitor.
PA made aware. Pt reoriented. Close observation.

## 2017-03-24 NOTE — DISCHARGE NOTE ADULT - HOSPITAL COURSE
89 y/o female, ambulatory with a cane or a walker (no recent h/o falls) with Hx of COPD, HTN, HLD, COPD, bioprosthetic aortic valve repair, HLD a/w UTI, generalized weakness and hypoxemia likely 2/2 mild COPD exacerbation; Frailty. Admitted for COPD exacerbation, treated with Prednisone. RVP negative  UTI E-coli completed 3 days of Ceftriaxone IV. UCx sensitivity_____  CAD, ASA, Metoprolol, statin. Echo; Normal LV, EF 65%. Moderate mitral regurgitation. mild pulmonary hypertension. Bilateral pleural effusions.***Unable to exclude small valvular vegetations  as all valves were not well visualized*****  Essential HTN-c/w Amlodipine 89 y/o female, ambulatory with a cane or a walker (no recent h/o falls) with Hx of COPD, HTN, HLD, COPD, bioprosthetic aortic valve repair, HLD a/w UTI, generalized weakness and hypoxemia likely 2/2 mild COPD exacerbation; Frailty. Admitted for COPD exacerbation, treated with Prednisone. RVP negative  UTI E-coli completed 3 days of Ceftriaxone IV.   CAD, ASA, Metoprolol, statin. Echo; Normal LV, EF 65%. Moderate mitral regurgitation. mild pulmonary hypertension. Bilateral pleural effusions.***Unable to exclude small valvular vegetations  as all valves were not well visualized*****  Essential HTN-c/w Amlodipine  Pt rec home w/ pt. Pt is optimized for discharge with home PT. Home oxygen delivered. 89 y/o female, ambulatory with a cane or a walker (no recent h/o falls) with Hx of COPD, HTN, HLD, COPD, bioprosthetic aortic valve repair, HLD a/w UTI, generalized weakness and hypoxemia likely 2/2 mild COPD exacerbation; Frailty. Admitted for COPD exacerbation, treated with Prednisone. RVP negative  UTI E-coli completed 3 days of Ceftriaxone IV.   CAD, ASA, Metoprolol, statin. Echo; Normal LV, EF 65%. Moderate mitral regurgitation. mild pulmonary hypertension. Bilateral pleural effusions.***Unable to exclude small valvular vegetations  as all valves were not well visualized*****  Essential HTN-c/w Amlodipine  Pt rec home w/ pt. Pt is optimized for discharge with home PT.   Home oxygen delivered.

## 2017-03-24 NOTE — DISCHARGE NOTE ADULT - MEDICATION SUMMARY - MEDICATIONS TO STOP TAKING
I will STOP taking the medications listed below when I get home from the hospital:    nystatin 100,000 units/mL oral suspension  -- 5 milliliter(s) by mouth every 8 hours    Lasix 40 mg oral tablet  -- 1 tab(s) by mouth once a day I will STOP taking the medications listed below when I get home from the hospital:    meclizine 12.5 mg oral tablet  -- 1 tab(s) by mouth 2 times a day, As needed, Dizziness    nystatin 100,000 units/mL oral suspension  -- 5 milliliter(s) by mouth every 8 hours    Lasix 40 mg oral tablet  -- 1 tab(s) by mouth once a day

## 2017-03-24 NOTE — DISCHARGE NOTE ADULT - NS AS ACTIVITY OBS
with assistance/Walking-Indoors allowed Walking-Outdoors allowed/with assistance/Showering allowed/Walking-Indoors allowed

## 2017-03-24 NOTE — DISCHARGE NOTE ADULT - CARE PROVIDER_API CALL
Kwasi Ayala), Cardiovascular Disease; Internal Medicine  [id number] [address] [place] [address]  Phone: [phone]  Fax: [phone]

## 2017-03-24 NOTE — DISCHARGE NOTE ADULT - CONDITIONS AT DISCHARGE
Patient alert and orientedx4 .Patient denies pain ,remains free form respiratory distress .Patient to go home with daughter with portable O2 via nasal cannula 2 liters .Patient given discharge education .Daughter and patient verbalises understanding

## 2017-03-24 NOTE — DISCHARGE NOTE ADULT - INSTRUCTIONS
Low salt, low fat diet Patient instructed to use portable O2 while travelling .Instructed to come to ED in case of respiratory distress ,fever and increasing weakness .verbalises understanidng

## 2017-03-25 LAB
-  AMIKACIN: SIGNIFICANT CHANGE UP
-  AMPICILLIN/SULBACTAM: SIGNIFICANT CHANGE UP
-  AMPICILLIN: SIGNIFICANT CHANGE UP
-  AZTREONAM: SIGNIFICANT CHANGE UP
-  CEFAZOLIN: SIGNIFICANT CHANGE UP
-  CEFEPIME: SIGNIFICANT CHANGE UP
-  CEFOXITIN: SIGNIFICANT CHANGE UP
-  CEFTAZIDIME: SIGNIFICANT CHANGE UP
-  CEFTRIAXONE: SIGNIFICANT CHANGE UP
-  CIPROFLOXACIN: SIGNIFICANT CHANGE UP
-  ERTAPENEM: SIGNIFICANT CHANGE UP
-  GENTAMICIN: SIGNIFICANT CHANGE UP
-  IMIPENEM: SIGNIFICANT CHANGE UP
-  LEVOFLOXACIN: SIGNIFICANT CHANGE UP
-  MEROPENEM: SIGNIFICANT CHANGE UP
-  NITROFURANTOIN: SIGNIFICANT CHANGE UP
-  PIPERACILLIN/TAZOBACTAM: SIGNIFICANT CHANGE UP
-  TIGECYCLINE: SIGNIFICANT CHANGE UP
-  TOBRAMYCIN: SIGNIFICANT CHANGE UP
-  TRIMETHOPRIM/SULFAMETHOXAZOLE: SIGNIFICANT CHANGE UP
BACTERIA UR CULT: SIGNIFICANT CHANGE UP
METHOD TYPE: SIGNIFICANT CHANGE UP
ORGANISM # SPEC MICROSCOPIC CNT: SIGNIFICANT CHANGE UP
ORGANISM # SPEC MICROSCOPIC CNT: SIGNIFICANT CHANGE UP

## 2017-03-25 PROCEDURE — 93306 TTE W/DOPPLER COMPLETE: CPT | Mod: 26

## 2017-03-25 RX ORDER — PANTOPRAZOLE SODIUM 20 MG/1
40 TABLET, DELAYED RELEASE ORAL ONCE
Qty: 0 | Refills: 0 | Status: COMPLETED | OUTPATIENT
Start: 2017-03-25 | End: 2017-03-25

## 2017-03-25 RX ORDER — PANTOPRAZOLE SODIUM 20 MG/1
40 TABLET, DELAYED RELEASE ORAL
Qty: 0 | Refills: 0 | Status: DISCONTINUED | OUTPATIENT
Start: 2017-03-26 | End: 2017-03-26

## 2017-03-25 RX ORDER — CEFTRIAXONE 500 MG/1
1000 INJECTION, POWDER, FOR SOLUTION INTRAMUSCULAR; INTRAVENOUS ONCE
Qty: 0 | Refills: 0 | Status: COMPLETED | OUTPATIENT
Start: 2017-03-25 | End: 2017-03-25

## 2017-03-25 RX ADMIN — Medication 1 APPLICATION(S): at 06:54

## 2017-03-25 RX ADMIN — HEPARIN SODIUM 5000 UNIT(S): 5000 INJECTION INTRAVENOUS; SUBCUTANEOUS at 06:54

## 2017-03-25 RX ADMIN — ATORVASTATIN CALCIUM 10 MILLIGRAM(S): 80 TABLET, FILM COATED ORAL at 22:32

## 2017-03-25 RX ADMIN — PANTOPRAZOLE SODIUM 40 MILLIGRAM(S): 20 TABLET, DELAYED RELEASE ORAL at 12:30

## 2017-03-25 RX ADMIN — HEPARIN SODIUM 5000 UNIT(S): 5000 INJECTION INTRAVENOUS; SUBCUTANEOUS at 18:23

## 2017-03-25 RX ADMIN — Medication 40 MILLIGRAM(S): at 06:54

## 2017-03-25 RX ADMIN — Medication 81 MILLIGRAM(S): at 12:30

## 2017-03-25 RX ADMIN — Medication 3 MILLILITER(S): at 16:28

## 2017-03-25 RX ADMIN — CEFTRIAXONE 1000 MILLIGRAM(S): 500 INJECTION, POWDER, FOR SOLUTION INTRAMUSCULAR; INTRAVENOUS at 12:37

## 2017-03-25 RX ADMIN — Medication 25 MILLIGRAM(S): at 18:23

## 2017-03-25 RX ADMIN — BUDESONIDE AND FORMOTEROL FUMARATE DIHYDRATE 1 PUFF(S): 160; 4.5 AEROSOL RESPIRATORY (INHALATION) at 22:32

## 2017-03-25 RX ADMIN — AMLODIPINE BESYLATE 10 MILLIGRAM(S): 2.5 TABLET ORAL at 06:54

## 2017-03-25 RX ADMIN — Medication 25 MILLIGRAM(S): at 06:55

## 2017-03-25 RX ADMIN — Medication 3 MILLILITER(S): at 22:23

## 2017-03-25 RX ADMIN — GABAPENTIN 100 MILLIGRAM(S): 400 CAPSULE ORAL at 22:32

## 2017-03-25 RX ADMIN — BUDESONIDE AND FORMOTEROL FUMARATE DIHYDRATE 1 PUFF(S): 160; 4.5 AEROSOL RESPIRATORY (INHALATION) at 12:30

## 2017-03-25 RX ADMIN — Medication 1 APPLICATION(S): at 18:22

## 2017-03-26 VITALS
DIASTOLIC BLOOD PRESSURE: 60 MMHG | SYSTOLIC BLOOD PRESSURE: 113 MMHG | TEMPERATURE: 99 F | OXYGEN SATURATION: 98 % | RESPIRATION RATE: 16 BRPM | HEART RATE: 72 BPM

## 2017-03-26 RX ORDER — PANTOPRAZOLE SODIUM 20 MG/1
1 TABLET, DELAYED RELEASE ORAL
Qty: 30 | Refills: 0 | OUTPATIENT
Start: 2017-03-26 | End: 2017-04-25

## 2017-03-26 RX ORDER — ALBUTEROL 90 UG/1
1 AEROSOL, METERED ORAL
Qty: 1 | Refills: 0 | OUTPATIENT
Start: 2017-03-26 | End: 2017-04-25

## 2017-03-26 RX ORDER — ERYTHROMYCIN BASE 5 MG/GRAM
1 OINTMENT (GRAM) OPHTHALMIC (EYE)
Qty: 1 | Refills: 0 | OUTPATIENT
Start: 2017-03-26 | End: 2017-03-29

## 2017-03-26 RX ADMIN — AMLODIPINE BESYLATE 10 MILLIGRAM(S): 2.5 TABLET ORAL at 07:18

## 2017-03-26 RX ADMIN — Medication 3 MILLILITER(S): at 09:23

## 2017-03-26 RX ADMIN — PANTOPRAZOLE SODIUM 40 MILLIGRAM(S): 20 TABLET, DELAYED RELEASE ORAL at 07:18

## 2017-03-26 RX ADMIN — Medication 3 MILLILITER(S): at 03:23

## 2017-03-26 RX ADMIN — Medication 1 APPLICATION(S): at 07:18

## 2017-03-26 RX ADMIN — Medication 25 MILLIGRAM(S): at 07:18

## 2017-03-26 RX ADMIN — HEPARIN SODIUM 5000 UNIT(S): 5000 INJECTION INTRAVENOUS; SUBCUTANEOUS at 07:18

## 2017-03-26 RX ADMIN — Medication 40 MILLIGRAM(S): at 07:18

## 2017-03-26 RX ADMIN — Medication 81 MILLIGRAM(S): at 11:39

## 2017-03-26 RX ADMIN — BUDESONIDE AND FORMOTEROL FUMARATE DIHYDRATE 1 PUFF(S): 160; 4.5 AEROSOL RESPIRATORY (INHALATION) at 11:39

## 2017-10-10 NOTE — DISCHARGE NOTE ADULT - NS AS DC DISCHARGE ACCOMPANY
Recovering From Depression: Care Instructions  Your Care Instructions  Taking good care of yourself is important as you recover from depression. In time, your symptoms will fade as your treatment takes hold. Do not give up. Instead, focus your energy on getting better. Your mood will improve. It just takes some time. Focus on things that can help you feel better, such as being with friends and family, eating well, and getting enough rest. But take things slowly. Do not do too much too soon. You will begin to feel better gradually. Follow-up care is a key part of your treatment and safety. Be sure to make and go to all appointments, and call your doctor if you are having problems. It's also a good idea to know your test results and keep a list of the medicines you take. How can you care for yourself at home? Be realistic  · If you have a large task to do, break it up into smaller steps you can handle, and just do what you can. · You may want to put off important decisions until your depression has lifted. If you have plans that will have a major impact on your life, such as marriage, divorce, or a job change, try to wait a bit. Talk it over with friends and loved ones who can help you look at the overall picture first.  · Reaching out to people for help is important. Do not isolate yourself. Let your family and friends help you. Find someone you can trust and confide in, and talk to that person. · Be patient, and be kind to yourself. Remember that depression is not your fault and is not something you can overcome with willpower alone. Treatment is necessary for depression, just like for any other illness. Feeling better takes time, and your mood will improve little by little. Stay active  · Stay busy and get outside. Take a walk, or try some other light exercise. · Talk with your doctor about an exercise program. Exercise can help with mild depression. · Go to a movie or concert.  Take part in a Jew activity or other social gathering. Go to a [employer] game. · Ask a friend to have dinner with you. Take care of yourself  · Eat a balanced diet with plenty of fresh fruits and vegetables, whole grains, and lean protein. If you have lost your appetite, eat small snacks rather than large meals. · Avoid drinking alcohol or using illegal drugs. Do not take medicines that have not been prescribed for you. They may interfere with medicines you may be taking for depression, or they may make your depression worse. · Take your medicines exactly as they are prescribed. You may start to feel better within 1 to 3 weeks of taking antidepressant medicine. But it can take as many as 6 to 8 weeks to see more improvement. If you have questions or concerns about your medicines, or if you do not notice any improvement by 3 weeks, talk to your doctor. · If you have any side effects from your medicine, tell your doctor. Antidepressants can make you feel tired, dizzy, or nervous. Some people have dry mouth, constipation, headaches, sexual problems, or diarrhea. Many of these side effects are mild and will go away on their own after you have been taking the medicine for a few weeks. Some may last longer. Talk to your doctor if side effects are bothering you too much. You might be able to try a different medicine. · Get enough sleep. If you have problems sleeping:  ¨ Go to bed at the same time every night, and get up at the same time every morning. ¨ Keep your bedroom dark and quiet. ¨ Do not exercise after 5:00 p.m. ¨ Avoid drinks with caffeine after 5:00 p.m. · Avoid sleeping pills unless they are prescribed by the doctor treating your depression. Sleeping pills may make you groggy during the day, and they may interact with other medicine you are taking. · If you have any other illnesses, such as diabetes, heart disease, or high blood pressure, make sure to continue with your treatment.  Tell your doctor about all of the medicines you take, including those with or without a prescription. · Keep the numbers for these national suicide hotlines: 8-514-921-TALK (0-508.504.4976) and 6-947-HMIMZUT (5-501.474.9320). If you or someone you know talks about suicide or feeling hopeless, get help right away. When should you call for help? Call 911 anytime you think you may need emergency care. For example, call if:  · You feel like hurting yourself or someone else. · Someone you know has depression and is about to attempt or is attempting suicide. Call your doctor now or seek immediate medical care if:  · You hear voices. · Someone you know has depression and:  ¨ Starts to give away his or her possessions. ¨ Uses illegal drugs or drinks alcohol heavily. ¨ Talks or writes about death, including writing suicide notes or talking about guns, knives, or pills. ¨ Starts to spend a lot of time alone. ¨ Acts very aggressively or suddenly appears calm. Watch closely for changes in your health, and be sure to contact your doctor if:  · You do not get better as expected. Where can you learn more? Go to https://Shook.[employer]ark[employer]. org and sign in to your [employer] account. Enter Z231 in the Lounge[employer] box to learn more about \"Recovering From Depression: Care Instructions. \"     If you do not have an account, please click on the \"Sign Up Now\" link. Current as of: July 26, 2016  Content Version: 11.3  © 7250-1732 [employer]. Care instructions adapted under license by [facility] ([facility]). If you have questions about a medical condition or this instruction, always ask your healthcare professional. [place] [address] any warranty or liability for your use of this information. Family

## 2021-04-20 NOTE — DISCHARGE NOTE ADULT - HOME CARE AGENCY
[facility] Care [phone] RN to see patient day after discharge Overweight/Obesity

## 2021-08-31 NOTE — PATIENT PROFILE ADULT. - HEALTHCARE INFORMATION NEEDED, PROFILE
Writer 2nd attempt to schedule patient. Writer called patient but there was no answer. Vm left to please call us back. None

## 2021-12-10 NOTE — PATIENT PROFILE ADULT. - TYPE OF ADMISSION, PATIENT PROFILE
Patient's  Mariama Bettencourt is calling to   Have this message forwarded to you
Please let patient know that I am aware. We will continue to follow and monitor as well. Please let me know if there is any changes over the weekend.
Spoke with  pt is doing better this week.  Pt has appointment with Dr.Stevens meehan, pt also has appointment with infusion center today and next week
Spoke with spouse and patient regarding urine results. Reports sugars have been running high. Patient has glucose and ketones in urine. She has been more lethargic than normal. Patient has not had sugar checked today as the battery is dead. Spouse replaced battery while I was on the phone with him and patient's BG level was at 346. Recommended calling Dr. Bella remy to see if patient should go to the ER. 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 Nikki Mcdonald is going to call when he gets off the phone. Also, discussed ongoing blood in the urine. Would like another UA in 2 weeks. If there is still blood, will refer to urology.
Emergent/ED

## 2022-07-10 NOTE — PATIENT PROFILE ADULT. - NS TRANSFER HEARING AID
Goal Outcome Evaluation:  Care for patient 1930      Patient chest tube flushed. Patient up with sba to bathroom. Oxy every 6 hours. Would like to talk to doctor during day shift tomorrow regarding pain meds. Pain clinic number on board in room. Does do medical cannibas at home and prescribed through her pain clinic. Received bolus iv fluids for low blood pressure and just finished and abx started.                Right

## 2023-01-08 NOTE — H&P ADULT. - PSH
89 H/O carotid endarterectomy  left  History of cholecystectomy    History of gastrectomy  1972  S/P cataract extraction  left  S/P coronary angiogram  November 2014- to assess aortic valve, no stents placed  S/P lumpectomy of breast  2010, right breast  S/P TAVR (transcatheter aortic valve replacement)  TRANSAORTIC APPROACH